# Patient Record
Sex: FEMALE | URBAN - METROPOLITAN AREA
[De-identification: names, ages, dates, MRNs, and addresses within clinical notes are randomized per-mention and may not be internally consistent; named-entity substitution may affect disease eponyms.]

---

## 2017-01-12 ENCOUNTER — IMPORTED ENCOUNTER (OUTPATIENT)
Dept: URBAN - METROPOLITAN AREA CLINIC 9 | Facility: CLINIC | Age: 67
End: 2017-01-12

## 2018-09-17 ENCOUNTER — IMPORTED ENCOUNTER (OUTPATIENT)
Dept: URBAN - METROPOLITAN AREA CLINIC 9 | Facility: CLINIC | Age: 68
End: 2018-09-17

## 2019-05-06 NOTE — PATIENT DISCUSSION
Monitor. Punctal Plug inserted in office today. Advised Pt that plugs will dissolve in 3-6 months and can call when symptoms return for insertion of new plugs. Continue regular daily use of ATs. Pt to call with any problems or concerns.

## 2019-05-06 NOTE — PATIENT DISCUSSION
Monitor for changes. Updated GLRx given today. Advised patient to call our office with decreased vision or increased symptoms.

## 2019-05-06 NOTE — PATIENT DISCUSSION
Punctal Plugs inserted into lower punctum OS with jewelers at slit lamp with no complications. Consent obtained. Oasis Extended Duration Soft Plug  Ref 42613.4 mm KERRY x 2mm LongLot#:  OD: [id number] / OS: [id number] Date: OD: 10/2019  /  OS: 12/2020.

## 2020-07-24 NOTE — PATIENT DISCUSSION
Monitor for changes.  Advised patient to call our office with decreased vision or increased symptoms.

## 2020-10-20 NOTE — PATIENT DISCUSSION
Pt reports improvement with Punctal Plugs previously and requests plugs again today. Discussed R/B/A's and advised plugs may dissolve in about 3 months and can call for new plugs when symptoms return.

## 2020-10-20 NOTE — PROCEDURE NOTE: CLINICAL
PROCEDURE NOTE: Punctal Plugs, Extended #2 Bilateral Lower Lids. Diagnosis: Keratoconjunctivitis Sicca, Not Specified As Sjögren's. 1 drop Proparacaine 0.5% instilled in each eye. 1 plug inserted in each lower punctum at slit lamp today with jewelers. No complications. Ronaldo Zhang

## 2021-01-25 NOTE — PATIENT DISCUSSION
Explained Bacterial vs Viral. If viral should be much improve in 1-2 weeks, if viral may take 3-6 weeks to run it's course.

## 2021-01-25 NOTE — PATIENT DISCUSSION
Advised Pt condition is contagious and should use contagious precautions. Wash hands frequently and after touching around eyes, do not share towels or pillows, and sanitize frequently touched surfaces, such as, phones, remotes, and door knobs.

## 2021-06-14 NOTE — PATIENT DISCUSSION
Contagious precautions. Wash hands frequently and after touching around eyes, do not share towels or pillows, and sanitize frequently touched surfaces, such as, phones, remotes, and door knobs.

## 2021-06-14 NOTE — PATIENT DISCUSSION
RTC as scheduled in 1 month for Exam, PACHY, OCT ONH, and possible Plugs, sooner if problems or changes occur.

## 2021-10-16 ASSESSMENT — KERATOMETRY
OD_K2POWER_DIOPTERS: 42.25
OS_K2POWER_DIOPTERS: 42.25
OD_K1POWER_DIOPTERS: 41.5
OS_AXISANGLE_DEGREES: 38
OD_AXISANGLE_DEGREES: 128
OS_AXISANGLE2_DEGREES: 128
OS_K1POWER_DIOPTERS: 41.5
OD_AXISANGLE2_DEGREES: 38

## 2021-10-16 ASSESSMENT — VISUAL ACUITY
OS_SC: 20/30 SN
OD_CC: 20/25 + SN
OD_SC: 20/40 SN
OS_CC: 20/20 SN
OS_CC: 20/20 SN
OD_CC: 20/20 SN

## 2021-10-16 ASSESSMENT — TONOMETRY
OS_IOP_MMHG: 12
OS_IOP_MMHG: 11
OD_IOP_MMHG: 11

## 2021-10-28 NOTE — PROCEDURE NOTE: CLINICAL
PROCEDURE NOTE: Punctal Plugs, Extended Bilateral Lower Lids. Diagnosis: Keratoconjunctivitis Sicca, Not Specified As Sjögren's. Prior to treatment, the risks/benefits/alternatives were discussed. The patient wished to proceed with procedure. Extended Duration plugs were inserted. Patient tolerated procedure well. There were no complications. Post procedure instructions given. Tye Kaba

## 2021-10-28 NOTE — PATIENT DISCUSSION
Discussed option of Punctal Plugs to improve symptoms. Risks and benefits discussed. Pt wishes to have plugs. Plugs inserted today in lower punctum OU at slit lamp today. No complications. Advised Pt that plugs will dissolve in 3-6 months and Pt can call to have new plugs inserted when symptoms return.

## 2022-06-02 NOTE — PATIENT DISCUSSION
RTC as scheduled in October for Exam, PACHY, OCT ONH, and possible Plugs, sooner if problems or changes occur.

## 2022-06-02 NOTE — PATIENT DISCUSSION
Punctal Plug R/B/A's discussed. Pt wishes to have plugs. Plugs inserted today in lower punctum OU at slit lamp today. No complications. Advised Pt that plugs will dissolve in 3-6 months and Pt can call to have new plugs inserted when symptoms return.

## 2022-06-02 NOTE — PROCEDURE NOTE: CLINICAL
PROCEDURE NOTE: Punctal Plugs, Extended Bilateral Lower Lids. Diagnosis: Keratoconjunctivitis Sicca, Not Specified As Sjögren's. Prior to treatment, the risks/benefits/alternatives were discussed. The patient wished to proceed with procedure. Temporary collagen plugs were inserted. Patient tolerated procedure well. There were no complications. Post procedure instructions given. 0.4mm Oasis Soft Plug Extended Duration  Lot #: [id number]  Exp: 10/01/2022.

## 2022-06-18 RX ORDER — MONTELUKAST SODIUM 10 MG/1
TABLET ORAL
COMMUNITY

## 2022-06-18 RX ORDER — NAPROXEN SODIUM 220 MG
TABLET ORAL
COMMUNITY

## 2022-06-18 RX ORDER — FLUTICASONE PROPIONATE 50 MCG
SPRAY, SUSPENSION (ML) NASAL
COMMUNITY

## 2022-10-31 NOTE — PATIENT DISCUSSION
Start Tobradex ST (covered by insurance) TID OU for one week. If too expensive with her pharmacy, try [facility]. Gave sheet for [facility].

## 2022-11-04 PROBLEM — F41.9 ANXIETY: Status: ACTIVE | Noted: 2022-11-04

## 2022-11-04 PROBLEM — G56.00 CARPAL TUNNEL SYNDROME: Status: ACTIVE | Noted: 2022-11-04

## 2022-11-04 PROBLEM — R76.8 POSITIVE COOMBS TEST: Status: ACTIVE | Noted: 2022-11-04

## 2022-11-04 PROBLEM — M54.9 CHRONIC BACK PAIN: Status: ACTIVE | Noted: 2022-11-04

## 2022-11-04 PROBLEM — J30.9 ALLERGIC RHINITIS: Status: ACTIVE | Noted: 2022-11-04

## 2022-11-04 PROBLEM — R11.2 POSTOPERATIVE NAUSEA AND VOMITING: Status: ACTIVE | Noted: 2022-11-04

## 2022-11-04 PROBLEM — M25.879 ANKLE IMPINGEMENT SYNDROME: Status: ACTIVE | Noted: 2022-11-04

## 2022-11-04 PROBLEM — G89.29 CHRONIC BACK PAIN: Status: ACTIVE | Noted: 2022-11-04

## 2022-11-04 PROBLEM — Z98.890 POSTOPERATIVE NAUSEA AND VOMITING: Status: ACTIVE | Noted: 2022-11-04

## 2022-11-04 PROBLEM — E78.5 HYPERLIPIDEMIA: Status: ACTIVE | Noted: 2022-11-04

## 2022-11-04 PROBLEM — M19.012 OSTEOARTHRITIS OF LEFT SHOULDER: Status: ACTIVE | Noted: 2022-11-04

## 2022-11-04 PROBLEM — N95.0 POSTMENOPAUSAL BLEEDING: Status: ACTIVE | Noted: 2022-11-04

## 2022-11-04 PROBLEM — J30.2 SEASONAL ALLERGIES: Status: ACTIVE | Noted: 2022-11-04

## 2022-11-04 PROBLEM — Z96.612 STATUS POST TOTAL REPLACEMENT OF LEFT SHOULDER: Status: ACTIVE | Noted: 2022-11-04

## 2022-11-04 PROBLEM — D17.20 LIPOMA OF ARM: Status: ACTIVE | Noted: 2022-11-04

## 2022-11-04 PROBLEM — M85.80 OSTEOPENIA: Status: ACTIVE | Noted: 2020-11-03

## 2022-11-04 PROBLEM — E55.9 VITAMIN D DEFICIENCY: Status: ACTIVE | Noted: 2022-11-04

## 2022-11-04 PROBLEM — M81.0 AGE RELATED OSTEOPOROSIS: Status: ACTIVE | Noted: 2022-11-04

## 2022-11-04 PROBLEM — M25.511 PAIN IN RIGHT SHOULDER: Status: ACTIVE | Noted: 2022-11-04

## 2022-11-04 PROBLEM — M54.2 CERVICALGIA: Status: ACTIVE | Noted: 2022-11-04

## 2022-11-04 PROBLEM — R20.9 DISTURBANCE OF SKIN SENSATION: Status: ACTIVE | Noted: 2022-11-04

## 2022-11-04 PROBLEM — M41.9 SCOLIOSIS: Status: ACTIVE | Noted: 2022-11-04

## 2022-11-04 PROBLEM — K21.9 GASTROESOPHAGEAL REFLUX DISEASE: Status: ACTIVE | Noted: 2022-11-04

## 2022-11-04 PROBLEM — N95.1 MENOPAUSAL SYMPTOMS: Status: ACTIVE | Noted: 2022-11-04

## 2022-11-04 PROBLEM — R92.8 ABNORMAL FINDINGS ON DIAGNOSTIC IMAGING OF BREAST: Status: ACTIVE | Noted: 2022-11-04

## 2022-11-04 PROBLEM — T75.3XXA MOTION SICKNESS: Status: ACTIVE | Noted: 2022-11-04

## 2022-11-04 PROBLEM — M21.40 PES PLANUS: Status: ACTIVE | Noted: 2022-11-04

## 2022-11-04 PROBLEM — R22.33: Status: ACTIVE | Noted: 2022-11-04
